# Patient Record
Sex: FEMALE | NOT HISPANIC OR LATINO | Employment: STUDENT | URBAN - METROPOLITAN AREA
[De-identification: names, ages, dates, MRNs, and addresses within clinical notes are randomized per-mention and may not be internally consistent; named-entity substitution may affect disease eponyms.]

---
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
## 2023-06-21 ENCOUNTER — OFFICE VISIT (OUTPATIENT)
Dept: URGENT CARE | Facility: URGENT CARE | Age: 18
End: 2023-06-21
Payer: COMMERCIAL

## 2023-06-21 VITALS
OXYGEN SATURATION: 100 % | SYSTOLIC BLOOD PRESSURE: 116 MMHG | RESPIRATION RATE: 16 BRPM | TEMPERATURE: 98.2 F | DIASTOLIC BLOOD PRESSURE: 80 MMHG | HEART RATE: 73 BPM

## 2023-06-21 DIAGNOSIS — M54.50 ACUTE LEFT-SIDED LOW BACK PAIN WITHOUT SCIATICA: Primary | ICD-10-CM

## 2023-06-21 PROCEDURE — 99214 OFFICE O/P EST MOD 30 MIN: CPT | Performed by: FAMILY MEDICINE

## 2023-06-21 RX ORDER — DICLOFENAC SODIUM 75 MG/1
75 TABLET, DELAYED RELEASE ORAL 2 TIMES DAILY
Qty: 10 TABLET | Refills: 0 | Status: SHIPPED | OUTPATIENT
Start: 2023-06-21 | End: 2023-06-26

## 2023-06-21 NOTE — PATIENT INSTRUCTIONS
Diclofenac twice daily for 5 days.  This is to reduce pain and inflammation.    Use arnica gel 2-3 times daily on areas of soreness.  This is available over the counter at places like [employer], [employer], [employer], etc.    Gentle stretching in the morning and before bed.  Try to walk at least 5 minutes per day.

## 2023-06-21 NOTE — LETTER
June 21, 2023      Elenita Mirza  [address] [facility] [address]    To Whom It May Concern:    Elenita Mirza was seen in our clinic today for evaluation of an acute non-contagious medical concern.  For the next 5 days, she should stand for no more than 60 minutes straight without at least a 5 minute break to sit down.  She should also avoid lifting anything more than 15 pounds for 5 days.  After 5 days, there are no restrictions.    Sincerely,        Alexa Fierro MD

## 2023-06-21 NOTE — PROGRESS NOTES
ICD-10-CM    1. Acute left-sided low back pain without sciatica  M54.50 diclofenac (VOLTAREN) 75 MG EC tablet        No red flags at this time to suggest cauda equina syndrome or other serious underlying conditions related to back pain.  Will write note for work with some short-term restrictions to facilitate healing of this musculoskeletal issue.    PLAN:  Patient Instructions   Diclofenac twice daily for 5 days.  This is to reduce pain and inflammation.    Use arnica gel 2-3 times daily on areas of soreness.  This is available over the counter at places like [employer], [employer], [employer], etc.    Gentle stretching in the morning and before bed.  Try to walk at least 5 minutes per day.    Discussed with patient that if she's not improving after another week, she should discuss referral to PT with primary care provider.  Encouraged patient to consider chiropractic care as well, though this could be included in her PT program depending on the location.    SUBJECTIVE:  Elenita Mirza is a 18 year old female who presents to  today with 5 days of lower back pain, mostly on the left side.  No radiating symptoms down either leg.  Has history of a fall in middle school and occasional LBP flares, but this one is lasting longer than usual.  Has tried Tylenol, Icy Hot, and something else OTC that her mom gave her (unsure what).  Minimal relief from these things so far.  Currently rates pain 7/10.  Worse with movement.  No bowel/bladder issues.  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    OBJECTIVE:  /80 (BP Location: Right arm, Patient Position: Sitting, Cuff Size: Adult Regular)   Pulse 73   Temp 98.2  F (36.8  C) (Tympanic)   Resp 16   SpO2 100%   GEN: well-appearing, in NAD  Back: no bony midline tenderness, no sciatic notch tenderness on the L, mildly increased tension in L lumbar paraspinals, no SI joint tenderness noted.  Neuro: strength 5/5 throughout both lower extremities, normal sensation to  light touch bilaterally from the knee down

## 2023-08-01 ENCOUNTER — TELEPHONE (OUTPATIENT)
Dept: OPHTHALMOLOGY | Facility: CLINIC | Age: 18
End: 2023-08-01

## 2023-08-01 ENCOUNTER — OFFICE VISIT (OUTPATIENT)
Dept: URGENT CARE | Facility: URGENT CARE | Age: 18
End: 2023-08-01
Payer: COMMERCIAL

## 2023-08-01 VITALS
TEMPERATURE: 99 F | RESPIRATION RATE: 16 BRPM | DIASTOLIC BLOOD PRESSURE: 78 MMHG | SYSTOLIC BLOOD PRESSURE: 122 MMHG | OXYGEN SATURATION: 99 % | HEART RATE: 77 BPM

## 2023-08-01 DIAGNOSIS — H57.89 REDNESS OF RIGHT EYE: Primary | ICD-10-CM

## 2023-08-01 DIAGNOSIS — H57.11 ACUTE RIGHT EYE PAIN: ICD-10-CM

## 2023-08-01 PROCEDURE — 99213 OFFICE O/P EST LOW 20 MIN: CPT | Performed by: PHYSICIAN ASSISTANT

## 2023-08-01 NOTE — PROGRESS NOTES
Assessment/Plan:    Unclear etiology for patient's symptoms. Differential diagnosis includes scleritis, iritis, dry eyes, conjunctivitis.   As sx have persisted x 1.5 weeks, advised seeing ophthalmology. Urgent referral placed, recommended ER for acute worsening in the meantime.     See patient instructions below.    At the end of the encounter, I discussed results, diagnosis, medications. Discussed red flags for immediate return to clinic/ER, as well as indications for follow up if no improvement. Patient understood and agreed to plan. Patient was stable for discharge.      ICD-10-CM    1. Redness of right eye  H57.89 Adult Eye  Referral      2. Acute right eye pain  H57.11 Adult Eye  Referral            Return in about 2 days (around 8/3/2023) for follow up with ophthalmology (eye doctor).    RYAN Mc, [facility]  -----------------------------------------------------------------------------------------------------------------------------------------------------    HPI:  Elenita Mirza is a 18 year old female who presents for evaluation of R eye redness onset 1.5 weeks ago. She reports pain in the eye when she tries to focus on something close to her. She has mild photophobia as well. No injury or foreign body to the eye. She has tried OTC eyedrops without improvement (name unknown). Patient reports no vision changes, eye watering/discharge, fever/chills, headache, rash, or any other symptoms. She does not wear contact lenses.    No past medical history on file.    Vitals:    08/01/23 1424   BP: 122/78   BP Location: Right arm   Patient Position: Sitting   Cuff Size: Adult Regular   Pulse: 77   Resp: 16   Temp: 99  F (37.2  C)   TempSrc: Tympanic   SpO2: 99%       Physical Exam  Vitals and nursing note reviewed.   Eyes:      General:         Right eye: No discharge.      Extraocular Movements: Extraocular movements intact.      Conjunctiva/sclera:       Right eye: Right conjunctiva is injected. No exudate.     Pupils: Pupils are equal, round, and reactive to light.      Comments: Conjunctival injection to lateral aspect of R eye only   Pulmonary:      Effort: Pulmonary effort is normal.   Neurological:      Mental Status: She is alert.         Labs/Imaging:  No results found for this or any previous visit (from the past 24 hour(s)).  No results found for this or any previous visit (from the past 24 hour(s)).        Patient Instructions   Go to ER for sudden worsening pain or vision changes.

## 2023-08-01 NOTE — TELEPHONE ENCOUNTER
Spoke to pt at 1723    Right eye redness--injected look per pt with eye pain.    Pain more constant per pt    Noted in corners of right eye    Light sensitivities with really bright lights    Started about a week ago    Some worsening in symptoms per pt'    Offered earlier appt this Friday with Dr. Palacios and pt accepts    Pt aware of date/time/location/duration at [facility] and aware to reach out for any acute worsening symptoms/vision changes    Carlo Unger RN 5:32 PM 08/01/23  s

## 2023-08-01 NOTE — TELEPHONE ENCOUNTER
This encounter is being sent to inform the clinic that this patient has a referral from Lillie Martin PA-C in  URGENT CARE, for the diagnoses of Redness of right eye [H57.89]Acute right eye pain [H57.11] and has requested that this patient be seen within 1-2 days and/or with next available.  Based on the availability of our provider(s), we are unable to accommodate this request.    Were all sites offered this patient?  Yes    Does scheduling algorithm request to schedule next available?  Patient has been scheduled for the first available opening with Dr Eaton on [id number].  We have informed the patient that the clinic will review their referral and reach out if a sooner appointment is medically necessary.     Scheduled 1st available and added to wait list    Thank you,

## 2023-08-04 ENCOUNTER — OFFICE VISIT (OUTPATIENT)
Dept: OPHTHALMOLOGY | Facility: CLINIC | Age: 18
End: 2023-08-04
Attending: PHYSICIAN ASSISTANT
Payer: COMMERCIAL

## 2023-08-04 DIAGNOSIS — H57.11 ACUTE RIGHT EYE PAIN: ICD-10-CM

## 2023-08-04 DIAGNOSIS — H57.89 REDNESS OF RIGHT EYE: ICD-10-CM

## 2023-08-04 DIAGNOSIS — H15.111 EPISCLERITIS PERIODICA FUGAX OF RIGHT EYE: Primary | ICD-10-CM

## 2023-08-04 PROCEDURE — 99203 OFFICE O/P NEW LOW 30 MIN: CPT | Performed by: OPTOMETRIST

## 2023-08-04 PROCEDURE — G0463 HOSPITAL OUTPT CLINIC VISIT: HCPCS | Performed by: OPTOMETRIST

## 2023-08-04 RX ORDER — FLUOROMETHOLONE 0.1 %
1 SUSPENSION, DROPS(FINAL DOSAGE FORM)(ML) OPHTHALMIC (EYE) 4 TIMES DAILY
Qty: 5 ML | Refills: 3 | Status: SHIPPED | OUTPATIENT
Start: 2023-08-04

## 2023-08-04 ASSESSMENT — CONF VISUAL FIELD
OS_NORMAL: 1
METHOD: COUNTING FINGERS
OS_INFERIOR_TEMPORAL_RESTRICTION: 0
OD_INFERIOR_NASAL_RESTRICTION: 0
OD_SUPERIOR_NASAL_RESTRICTION: 0
OS_INFERIOR_NASAL_RESTRICTION: 0
OD_SUPERIOR_TEMPORAL_RESTRICTION: 0
OS_SUPERIOR_NASAL_RESTRICTION: 0
OD_NORMAL: 1
OS_SUPERIOR_TEMPORAL_RESTRICTION: 0
OD_INFERIOR_TEMPORAL_RESTRICTION: 0

## 2023-08-04 ASSESSMENT — VISUAL ACUITY
OD_SC: 20/20
METHOD: SNELLEN - LINEAR
OS_SC: 20/25
OS_SC+: -3

## 2023-08-04 ASSESSMENT — EXTERNAL EXAM - RIGHT EYE: OD_EXAM: NORMAL

## 2023-08-04 ASSESSMENT — SLIT LAMP EXAM - LIDS
COMMENTS: NORMAL
COMMENTS: NORMAL

## 2023-08-04 ASSESSMENT — TONOMETRY
OD_IOP_MMHG: 16
IOP_METHOD: ICARE
OS_IOP_MMHG: 15

## 2023-08-04 ASSESSMENT — EXTERNAL EXAM - LEFT EYE: OS_EXAM: NORMAL

## 2023-08-04 NOTE — PROGRESS NOTES
Assessment & Plan       Elenita Mirza is a 18 year old female with the following diagnoses:   1. 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 Redness of right eye - Right Eye    3. Acute right eye pain - Right Eye          FML one drop 4 X per day   Recheck 1 week    Patient disposition:   No follow-ups on file.          Complete documentation of historical and exam elements from today's encounter can be found in the full encounter summary report (not reduplicated in this progress note). I personally obtained the chief complaint(s) and history of present illness.  I confirmed and edited as necessary the review of systems, past medical/surgical history, family history, social history, and examination findings as documented by others; and I examined the patient myself. I personally reviewed the relevant tests, images, and reports as documented above. I formulated and edited as necessary the assessment and plan and discussed the findings and management plan with the patient and family.  Dr. Eric Palacios

## 2023-08-04 NOTE — NURSING NOTE
Chief Complaints and History of Present Illnesses   Patient presents with    Redness/discharge Of Eye     Chief Complaint(s) and History of Present Illness(es)       Redness/discharge Of Eye               Comments    Patient states that she has been having a sharp pain in her RE for a week and a half. She states that the redness started as a little speck and then it got bigger. No blurry vision. No itching.     Ocular Meds:artifical tears    Humberto SANDHU, August 4, 2023 1:26 PM